# Patient Record
Sex: FEMALE | Race: WHITE | NOT HISPANIC OR LATINO | Employment: FULL TIME | URBAN - METROPOLITAN AREA
[De-identification: names, ages, dates, MRNs, and addresses within clinical notes are randomized per-mention and may not be internally consistent; named-entity substitution may affect disease eponyms.]

---

## 2017-12-04 ENCOUNTER — OFFICE VISIT (OUTPATIENT)
Dept: URGENT CARE | Facility: CLINIC | Age: 52
End: 2017-12-04
Payer: COMMERCIAL

## 2017-12-04 VITALS
WEIGHT: 145 LBS | BODY MASS INDEX: 26.68 KG/M2 | DIASTOLIC BLOOD PRESSURE: 94 MMHG | TEMPERATURE: 98 F | RESPIRATION RATE: 19 BRPM | HEIGHT: 62 IN | OXYGEN SATURATION: 99 % | HEART RATE: 71 BPM | SYSTOLIC BLOOD PRESSURE: 176 MMHG

## 2017-12-04 DIAGNOSIS — L02.212 ABSCESS OF BACK: Primary | ICD-10-CM

## 2017-12-04 PROCEDURE — 99213 OFFICE O/P EST LOW 20 MIN: CPT | Mod: S$GLB,,, | Performed by: INTERNAL MEDICINE

## 2017-12-04 RX ORDER — MUPIROCIN 20 MG/G
OINTMENT TOPICAL
Qty: 22 G | Refills: 1 | Status: SHIPPED | OUTPATIENT
Start: 2017-12-04

## 2017-12-04 RX ORDER — SULFAMETHOXAZOLE AND TRIMETHOPRIM 800; 160 MG/1; MG/1
1 TABLET ORAL 2 TIMES DAILY
Qty: 14 TABLET | Refills: 0 | Status: SHIPPED | OUTPATIENT
Start: 2017-12-04

## 2017-12-05 NOTE — PROGRESS NOTES
"Subjective:       Patient ID: Shahida Forte is a 52 y.o. female.    Vitals:  height is 5' 2" (1.575 m) and weight is 65.8 kg (145 lb). Her temperature is 98.4 °F (36.9 °C). Her blood pressure is 176/94 (abnormal) and her pulse is 71. Her respiration is 19 and oxygen saturation is 99%.     Chief Complaint: Abscess    Abscess   Chronicity:  NewProgression Since Onset: gradually worsening  Location:  Torso  Associated Symptoms: no fever, no chills  Characteristics: painful, redness and swelling    Pain Scale:  5/10  Treatments Tried:  Draining/squeezing  Relieved by:  Nothing  Worsened by:  Nothing    Review of Systems   Constitution: Negative for chills and fever.   HENT: Negative for sore throat.    Eyes: Negative for blurred vision.   Cardiovascular: Negative for chest pain.   Respiratory: Negative for shortness of breath.    Skin: Positive for color change and suspicious lesions. Negative for rash.   Musculoskeletal: Negative for back pain and joint pain.   Gastrointestinal: Negative for abdominal pain, diarrhea, nausea and vomiting.   Neurological: Negative for headaches.   Psychiatric/Behavioral: The patient is not nervous/anxious.        Objective:      Physical Exam   Constitutional: She appears well-developed and well-nourished.   Skin:   Abscess on upper back   Vitals reviewed.    Incision & Drainage  Date/Time: 12/4/2017 9:16 PM  Performed by: CLEMENTE FAITH.  Authorized by: CLEMENTE FAITH     Time out: Immediately prior to procedure a "time out" was called to verify the correct patient, procedure, equipment, support staff and site/side marked as required.    Consent Done?:  Yes (Verbal)    Type:  Abscess  Body area:  Trunk  Anesthesia:  Local infiltration  Local anesthetic:  Lidocaine 2% without epinephrinelidocaine 2% without epinephrine  Anesthetic total (ml):  5  Scalpel size:  11  Incision type:  Single straight  Complexity:  Simple  Drainage:  Pus  Drainage amount:  Moderate  Wound treatment:  " Wound left open, expression of material and wound packed  Packing material:  1/4 in iodoform gauze  Patient tolerance:  Patient tolerated the procedure well with no immediate complications      Assessment:       1. Abscess of back        Plan:         Abscess of back  -     INCISION AND DRAINAGE  -     sulfamethoxazole-trimethoprim 800-160mg (BACTRIM DS) 800-160 mg Tab; Take 1 tablet by mouth 2 (two) times daily.  Dispense: 14 tablet; Refill: 0  -     mupirocin (BACTROBAN) 2 % ointment; Apply to affected area 2 times daily  Dispense: 22 g; Refill: 1